# Patient Record
Sex: MALE | Race: WHITE | Employment: OTHER | URBAN - METROPOLITAN AREA
[De-identification: names, ages, dates, MRNs, and addresses within clinical notes are randomized per-mention and may not be internally consistent; named-entity substitution may affect disease eponyms.]

---

## 2018-11-20 ENCOUNTER — HOSPITAL ENCOUNTER (INPATIENT)
Age: 78
LOS: 1 days | Discharge: HOME | DRG: 175 | End: 2018-11-22
Attending: EMERGENCY MEDICINE | Admitting: HOSPITALIST
Payer: MEDICARE

## 2018-11-20 ENCOUNTER — APPOINTMENT (OUTPATIENT)
Dept: CT IMAGING | Age: 78
DRG: 175 | End: 2018-11-20
Payer: MEDICARE

## 2018-11-20 ENCOUNTER — APPOINTMENT (OUTPATIENT)
Dept: GENERAL RADIOLOGY | Age: 78
DRG: 175 | End: 2018-11-20
Payer: MEDICARE

## 2018-11-20 DIAGNOSIS — I26.99 PULMONARY EMBOLUS AND INFARCTION (HCC): ICD-10-CM

## 2018-11-20 DIAGNOSIS — I26.99 OTHER ACUTE PULMONARY EMBOLISM WITHOUT ACUTE COR PULMONALE (HCC): ICD-10-CM

## 2018-11-20 DIAGNOSIS — I26.99 PULMONARY INFARCT (HCC): ICD-10-CM

## 2018-11-20 DIAGNOSIS — R07.9 RIGHT-SIDED CHEST PAIN: Primary | ICD-10-CM

## 2018-11-20 LAB
A/G RATIO: 1.3 (ref 1.1–2.2)
ALBUMIN SERPL-MCNC: 4 G/DL (ref 3.4–5)
ALP BLD-CCNC: 77 U/L (ref 40–129)
ALT SERPL-CCNC: 13 U/L (ref 10–40)
ANION GAP SERPL CALCULATED.3IONS-SCNC: 14 MMOL/L (ref 3–16)
AST SERPL-CCNC: 16 U/L (ref 15–37)
BASOPHILS ABSOLUTE: 0 K/UL (ref 0–0.2)
BASOPHILS RELATIVE PERCENT: 0 %
BILIRUB SERPL-MCNC: 1.5 MG/DL (ref 0–1)
BUN BLDV-MCNC: 30 MG/DL (ref 7–20)
CALCIUM SERPL-MCNC: 9.3 MG/DL (ref 8.3–10.6)
CHLORIDE BLD-SCNC: 99 MMOL/L (ref 99–110)
CO2: 22 MMOL/L (ref 21–32)
CREAT SERPL-MCNC: 1 MG/DL (ref 0.8–1.3)
EOSINOPHILS ABSOLUTE: 0 K/UL (ref 0–0.6)
EOSINOPHILS RELATIVE PERCENT: 0 %
GFR AFRICAN AMERICAN: >60
GFR NON-AFRICAN AMERICAN: >60
GLOBULIN: 3.1 G/DL
GLUCOSE BLD-MCNC: 185 MG/DL (ref 70–99)
HCT VFR BLD CALC: 47.9 % (ref 40.5–52.5)
HEMATOLOGY PATH CONSULT: YES
HEMOGLOBIN: 16.5 G/DL (ref 13.5–17.5)
LACTIC ACID: 1.5 MMOL/L (ref 0.4–2)
LIPASE: 34 U/L (ref 13–60)
LYMPHOCYTES ABSOLUTE: 1.5 K/UL (ref 1–5.1)
LYMPHOCYTES RELATIVE PERCENT: 10 %
MCH RBC QN AUTO: 30.7 PG (ref 26–34)
MCHC RBC AUTO-ENTMCNC: 34.5 G/DL (ref 31–36)
MCV RBC AUTO: 88.9 FL (ref 80–100)
MONOCYTES ABSOLUTE: 1.8 K/UL (ref 0–1.3)
MONOCYTES RELATIVE PERCENT: 12 %
NEUTROPHILS ABSOLUTE: 11.9 K/UL (ref 1.7–7.7)
NEUTROPHILS RELATIVE PERCENT: 78 %
PDW BLD-RTO: 13.4 % (ref 12.4–15.4)
PLATELET # BLD: 164 K/UL (ref 135–450)
PLATELET SLIDE REVIEW: ADEQUATE
PMV BLD AUTO: 9.7 FL (ref 5–10.5)
POTASSIUM SERPL-SCNC: 3.7 MMOL/L (ref 3.5–5.1)
RBC # BLD: 5.39 M/UL (ref 4.2–5.9)
RBC # BLD: NORMAL 10*6/UL
SLIDE REVIEW: ABNORMAL
SODIUM BLD-SCNC: 135 MMOL/L (ref 136–145)
TOTAL PROTEIN: 7.1 G/DL (ref 6.4–8.2)
WBC # BLD: 15.2 K/UL (ref 4–11)

## 2018-11-20 PROCEDURE — 85025 COMPLETE CBC W/AUTO DIFF WBC: CPT

## 2018-11-20 PROCEDURE — 83690 ASSAY OF LIPASE: CPT

## 2018-11-20 PROCEDURE — 83605 ASSAY OF LACTIC ACID: CPT

## 2018-11-20 PROCEDURE — 6370000000 HC RX 637 (ALT 250 FOR IP): Performed by: NURSE PRACTITIONER

## 2018-11-20 PROCEDURE — 99285 EMERGENCY DEPT VISIT HI MDM: CPT

## 2018-11-20 PROCEDURE — 6360000004 HC RX CONTRAST MEDICATION: Performed by: NURSE PRACTITIONER

## 2018-11-20 PROCEDURE — 71046 X-RAY EXAM CHEST 2 VIEWS: CPT

## 2018-11-20 PROCEDURE — 93005 ELECTROCARDIOGRAM TRACING: CPT | Performed by: NURSE PRACTITIONER

## 2018-11-20 PROCEDURE — 71275 CT ANGIOGRAPHY CHEST: CPT

## 2018-11-20 PROCEDURE — 85610 PROTHROMBIN TIME: CPT

## 2018-11-20 PROCEDURE — 80053 COMPREHEN METABOLIC PANEL: CPT

## 2018-11-20 PROCEDURE — 74177 CT ABD & PELVIS W/CONTRAST: CPT

## 2018-11-20 PROCEDURE — 85730 THROMBOPLASTIN TIME PARTIAL: CPT

## 2018-11-20 RX ORDER — HEPARIN SODIUM 1000 [USP'U]/ML
40 INJECTION, SOLUTION INTRAVENOUS; SUBCUTANEOUS PRN
Status: DISCONTINUED | OUTPATIENT
Start: 2018-11-20 | End: 2018-11-20

## 2018-11-20 RX ORDER — HEPARIN SODIUM 10000 [USP'U]/100ML
16.2 INJECTION, SOLUTION INTRAVENOUS CONTINUOUS
Status: DISCONTINUED | OUTPATIENT
Start: 2018-11-21 | End: 2018-11-21

## 2018-11-20 RX ORDER — HEPARIN SODIUM 1000 [USP'U]/ML
80 INJECTION, SOLUTION INTRAVENOUS; SUBCUTANEOUS PRN
Status: DISCONTINUED | OUTPATIENT
Start: 2018-11-20 | End: 2018-11-20

## 2018-11-20 RX ORDER — AMOXICILLIN AND CLAVULANATE POTASSIUM 875; 125 MG/1; MG/1
1 TABLET, FILM COATED ORAL ONCE
Status: COMPLETED | OUTPATIENT
Start: 2018-11-20 | End: 2018-11-20

## 2018-11-20 RX ORDER — HEPARIN SODIUM 1000 [USP'U]/ML
7200 INJECTION, SOLUTION INTRAVENOUS; SUBCUTANEOUS ONCE
Status: DISCONTINUED | OUTPATIENT
Start: 2018-11-21 | End: 2018-11-21

## 2018-11-20 RX ORDER — AZITHROMYCIN 500 MG/1
500 TABLET, FILM COATED ORAL ONCE
Status: COMPLETED | OUTPATIENT
Start: 2018-11-20 | End: 2018-11-20

## 2018-11-20 RX ADMIN — IOPAMIDOL 75 ML: 755 INJECTION, SOLUTION INTRAVENOUS at 22:22

## 2018-11-20 RX ADMIN — AMOXICILLIN AND CLAVULANATE POTASSIUM 1 TABLET: 875; 125 TABLET, FILM COATED ORAL at 22:55

## 2018-11-20 RX ADMIN — AZITHROMYCIN 500 MG: 500 TABLET, FILM COATED ORAL at 22:55

## 2018-11-20 ASSESSMENT — ENCOUNTER SYMPTOMS
ABDOMINAL PAIN: 1
VOMITING: 0
NAUSEA: 0
SHORTNESS OF BREATH: 1

## 2018-11-20 ASSESSMENT — PAIN DESCRIPTION - PAIN TYPE: TYPE: ACUTE PAIN

## 2018-11-20 ASSESSMENT — PAIN SCALES - GENERAL: PAINLEVEL_OUTOF10: 7

## 2018-11-20 ASSESSMENT — PAIN DESCRIPTION - LOCATION: LOCATION: ABDOMEN

## 2018-11-20 ASSESSMENT — PAIN DESCRIPTION - DESCRIPTORS: DESCRIPTORS: CRAMPING

## 2018-11-21 PROBLEM — I26.99 PULMONARY EMBOLUS AND INFARCTION (HCC): Status: ACTIVE | Noted: 2018-11-21

## 2018-11-21 LAB
APTT: 117 SEC (ref 26–36)
APTT: 27.3 SEC (ref 26–36)
APTT: 77.5 SEC (ref 26–36)
APTT: 87.9 SEC (ref 26–36)
EKG ATRIAL RATE: 92 BPM
EKG DIAGNOSIS: NORMAL
EKG P AXIS: 48 DEGREES
EKG P-R INTERVAL: 164 MS
EKG Q-T INTERVAL: 362 MS
EKG QRS DURATION: 94 MS
EKG QTC CALCULATION (BAZETT): 447 MS
EKG R AXIS: -51 DEGREES
EKG T AXIS: 46 DEGREES
EKG VENTRICULAR RATE: 92 BPM
HEMATOLOGY PATH CONSULT: NORMAL
INR BLD: 1.33 (ref 0.86–1.14)
LV EF: 55 %
LVEF MODALITY: NORMAL
PROTHROMBIN TIME: 15.2 SEC (ref 9.8–13)

## 2018-11-21 PROCEDURE — 99223 1ST HOSP IP/OBS HIGH 75: CPT | Performed by: INTERNAL MEDICINE

## 2018-11-21 PROCEDURE — 85730 THROMBOPLASTIN TIME PARTIAL: CPT

## 2018-11-21 PROCEDURE — 2700000000 HC OXYGEN THERAPY PER DAY

## 2018-11-21 PROCEDURE — 93306 TTE W/DOPPLER COMPLETE: CPT

## 2018-11-21 PROCEDURE — 6360000002 HC RX W HCPCS: Performed by: HOSPITALIST

## 2018-11-21 PROCEDURE — 93010 ELECTROCARDIOGRAM REPORT: CPT | Performed by: INTERNAL MEDICINE

## 2018-11-21 PROCEDURE — 2060000000 HC ICU INTERMEDIATE R&B

## 2018-11-21 PROCEDURE — 6370000000 HC RX 637 (ALT 250 FOR IP): Performed by: HOSPITALIST

## 2018-11-21 PROCEDURE — 36415 COLL VENOUS BLD VENIPUNCTURE: CPT

## 2018-11-21 PROCEDURE — 94761 N-INVAS EAR/PLS OXIMETRY MLT: CPT

## 2018-11-21 PROCEDURE — 93970 EXTREMITY STUDY: CPT

## 2018-11-21 RX ORDER — SODIUM CHLORIDE 0.9 % (FLUSH) 0.9 %
10 SYRINGE (ML) INJECTION PRN
Status: DISCONTINUED | OUTPATIENT
Start: 2018-11-21 | End: 2018-11-22 | Stop reason: HOSPADM

## 2018-11-21 RX ORDER — HEPARIN SODIUM 10000 [USP'U]/100ML
12.6 INJECTION, SOLUTION INTRAVENOUS CONTINUOUS
Status: DISCONTINUED | OUTPATIENT
Start: 2018-11-21 | End: 2018-11-22

## 2018-11-21 RX ORDER — ASPIRIN 81 MG/1
81 TABLET, CHEWABLE ORAL DAILY
Status: ON HOLD | COMMUNITY
End: 2018-11-22 | Stop reason: HOSPADM

## 2018-11-21 RX ORDER — OXYCODONE HYDROCHLORIDE AND ACETAMINOPHEN 5; 325 MG/1; MG/1
2 TABLET ORAL EVERY 4 HOURS PRN
Status: DISCONTINUED | OUTPATIENT
Start: 2018-11-21 | End: 2018-11-22 | Stop reason: HOSPADM

## 2018-11-21 RX ORDER — OXYCODONE HYDROCHLORIDE AND ACETAMINOPHEN 5; 325 MG/1; MG/1
1 TABLET ORAL EVERY 4 HOURS PRN
Status: DISCONTINUED | OUTPATIENT
Start: 2018-11-21 | End: 2018-11-22 | Stop reason: HOSPADM

## 2018-11-21 RX ORDER — ACETAMINOPHEN 325 MG/1
650 TABLET ORAL EVERY 4 HOURS PRN
Status: DISCONTINUED | OUTPATIENT
Start: 2018-11-21 | End: 2018-11-22 | Stop reason: HOSPADM

## 2018-11-21 RX ORDER — TRIAMTERENE AND HYDROCHLOROTHIAZIDE 37.5; 25 MG/1; MG/1
1 TABLET ORAL DAILY
Status: ON HOLD | COMMUNITY
End: 2018-11-22 | Stop reason: HOSPADM

## 2018-11-21 RX ORDER — HEPARIN SODIUM 1000 [USP'U]/ML
80 INJECTION, SOLUTION INTRAVENOUS; SUBCUTANEOUS PRN
Status: DISCONTINUED | OUTPATIENT
Start: 2018-11-21 | End: 2018-11-21

## 2018-11-21 RX ORDER — LISINOPRIL AND HYDROCHLOROTHIAZIDE 25; 20 MG/1; MG/1
1 TABLET ORAL DAILY
Status: ON HOLD | COMMUNITY
End: 2018-11-21 | Stop reason: CLARIF

## 2018-11-21 RX ORDER — LISINOPRIL 20 MG/1
20 TABLET ORAL 2 TIMES DAILY
Status: ON HOLD | COMMUNITY
End: 2018-11-22

## 2018-11-21 RX ORDER — VERAPAMIL HYDROCHLORIDE 240 MG/1
240 TABLET, FILM COATED, EXTENDED RELEASE ORAL NIGHTLY
Status: DISCONTINUED | OUTPATIENT
Start: 2018-11-21 | End: 2018-11-22 | Stop reason: HOSPADM

## 2018-11-21 RX ORDER — CHLORAL HYDRATE 500 MG
1000 CAPSULE ORAL DAILY
COMMUNITY

## 2018-11-21 RX ORDER — ATENOLOL 25 MG/1
25 TABLET ORAL 2 TIMES DAILY
Status: DISCONTINUED | OUTPATIENT
Start: 2018-11-21 | End: 2018-11-22

## 2018-11-21 RX ORDER — SODIUM CHLORIDE 0.9 % (FLUSH) 0.9 %
10 SYRINGE (ML) INJECTION EVERY 12 HOURS SCHEDULED
Status: DISCONTINUED | OUTPATIENT
Start: 2018-11-21 | End: 2018-11-22 | Stop reason: HOSPADM

## 2018-11-21 RX ORDER — ATENOLOL 25 MG/1
25 TABLET ORAL 2 TIMES DAILY
Status: ON HOLD | COMMUNITY
End: 2018-11-22

## 2018-11-21 RX ORDER — ONDANSETRON 2 MG/ML
4 INJECTION INTRAMUSCULAR; INTRAVENOUS EVERY 6 HOURS PRN
Status: DISCONTINUED | OUTPATIENT
Start: 2018-11-21 | End: 2018-11-22 | Stop reason: HOSPADM

## 2018-11-21 RX ORDER — HEPARIN SODIUM 1000 [USP'U]/ML
40 INJECTION, SOLUTION INTRAVENOUS; SUBCUTANEOUS PRN
Status: DISCONTINUED | OUTPATIENT
Start: 2018-11-21 | End: 2018-11-21

## 2018-11-21 RX ORDER — ATORVASTATIN CALCIUM 20 MG/1
20 TABLET, FILM COATED ORAL NIGHTLY
Status: DISCONTINUED | OUTPATIENT
Start: 2018-11-21 | End: 2018-11-22 | Stop reason: HOSPADM

## 2018-11-21 RX ORDER — LOVASTATIN 40 MG/1
40 TABLET ORAL 2 TIMES DAILY
COMMUNITY

## 2018-11-21 RX ORDER — VERAPAMIL HYDROCHLORIDE 240 MG/1
240 CAPSULE, EXTENDED RELEASE ORAL NIGHTLY
COMMUNITY

## 2018-11-21 RX ADMIN — ATENOLOL 25 MG: 25 TABLET ORAL at 15:17

## 2018-11-21 RX ADMIN — HEPARIN SODIUM 16.2 ML/HR: 10000 INJECTION, SOLUTION INTRAVENOUS at 01:12

## 2018-11-21 RX ADMIN — ATORVASTATIN CALCIUM 20 MG: 20 TABLET, FILM COATED ORAL at 21:37

## 2018-11-21 RX ADMIN — ATENOLOL 25 MG: 25 TABLET ORAL at 21:36

## 2018-11-21 RX ADMIN — VERAPAMIL HYDROCHLORIDE 240 MG: 240 TABLET, FILM COATED, EXTENDED RELEASE ORAL at 21:37

## 2018-11-21 RX ADMIN — HEPARIN SODIUM 16.2 ML/HR: 10000 INJECTION, SOLUTION INTRAVENOUS at 17:48

## 2018-11-21 ASSESSMENT — PAIN SCALES - GENERAL
PAINLEVEL_OUTOF10: 0
PAINLEVEL_OUTOF10: 0
PAINLEVEL_OUTOF10: 7
PAINLEVEL_OUTOF10: 0
PAINLEVEL_OUTOF10: 0
PAINLEVEL_OUTOF10: 2

## 2018-11-21 ASSESSMENT — PAIN DESCRIPTION - PAIN TYPE: TYPE: ACUTE PAIN

## 2018-11-21 ASSESSMENT — PAIN DESCRIPTION - LOCATION: LOCATION: ABDOMEN

## 2018-11-21 NOTE — CONSULTS
pm    COMPARISON:  None. HISTORY:  ORDERING SYSTEM PROVIDED HISTORY: sob  TECHNOLOGIST PROVIDED HISTORY:  Reason for exam:->sob  Ordering Physician Provided Reason for Exam: SOB  Acuity: Acute  Type of Exam: Initial    FINDINGS:  Heart size is normal.  Mediastinal contours are normal.  Pulmonary  vascularity is normal.  Atherosclerotic change is seen in the aorta. Hazy  opacity is seen at the lung bases bilaterally, right greater than left. Superimposed pleural fluid is likely present bilaterally, right greater than  left      Impression Hazy opacity at the lung bases, either atelectasis or pneumonia         CXR portable: No results found for this or any previous visit. Assessment:     Bilateral Pulmonary Emboli    Plan:      -Unclear precipitating event. No known malignancy, no recent travel or trauma or immobilization. -will likely need hypercoagulable work up follow anticoagulation, I suppose some genetic mutations could be tested at this time. Will also need age appropriate cancer screening.  -evidence of possible RLL infarction on CT scan, will monitor. Likely explains RUQ pain via diaphragmatic irritation.  -agree with heparin gtt for now, no signs of bleeding or hemodynamic compromise to necessitate change in therapy. -LE dopplers and echo pending. We will follow along.     Thank you for the consult    [address]  Pulmonary, Sleep and Critical Care Medicine

## 2018-11-21 NOTE — ED PROVIDER NOTES
Attending Supervisory Note/Shared Visit   I have personally performed a face to face diagnostic evaluation on this patient. I have reviewed the mid-levels findings and agree. History is remarkable for RUQ abd pain. Pt has been feeling sore in RUQ and R lower chest and had flue shot yesterday which exacerbated sx. Exam is remarkable for no surgical RUQ findings. However, pt noted to be tachypneic on arrival and had some crackles in bases. Elevated WBC and concern for PNA discussed with ALAN. EKG (interpreted by me)  Rhythm: normal sinus at 92 bpm  Axis: normal  Conduction: LAFB  ST/T Segments: Normal  Clinical Impression: No acute findings. No prior EKG. Labs Reviewed   CBC WITH AUTO DIFFERENTIAL - Abnormal; Notable for the following:        Result Value    WBC 15.2 (*)     Neutrophils # 11.9 (*)     Monocytes # 1.8 (*)     All other components within normal limits    Narrative:     Performed at:  [facility]  [address] [place]   Phone [phone]   COMPREHENSIVE METABOLIC PANEL - Abnormal; Notable for the following:     Sodium 135 (*)     Glucose 185 (*)     BUN 30 (*)     Total Bilirubin 1.5 (*)     All other components within normal limits    Narrative:     Performed at:  [facility]  [address] [facility] [id number]   Phone [phone]   LIPASE    Narrative:     Performed at:  [facility]  [address] [facility] [address] [address]   Phone [phone]   LACTIC ACID, PLASMA    Narrative:     Performed at:  [facility]  [address] [facility] [address] [address]   Phone [phone]   URINE RT REFLEX TO CULTURE   PROTIME-INR   APTT   APTT     CTA PULMONARY W CONTRAST   Preliminary Result   Acute segmental and subsegmental pulmonary emboli involving all 5 lobes.       No evidence of right heart strain, however, there are

## 2018-11-21 NOTE — ED PROVIDER NOTES
[address]  [address] [facility] [address]  Dept: [phone]  Loc: [phone]  eMERGENCYdEPARTMENT eNCOUnter      Pt Name: Funmilayo Robledo  MRN: [id number]  [place] 1940  Date of evaluation: 11/20/2018  Provider:Caroline Centeno, [address] [facility]       Chief Complaint   Patient presents with    Abdominal Pain     abdominal pain since 11/19/2018 in pm       CRITICAL CARE TIME       HISTORY OF PRESENT ILLNESS  (Location/Symptom, Timing/Onset, Context/Setting, Quality, Duration,Modifying Factors, Severity.)   Funmilayo Robledo is a 66 y.o. male who presents to the emergency department Complaining of shortness of breath, shakiness, right upper quadrant abdominal pain, right lower chest pain for several days. Much worse the last 24 hours. Prior to the onset of pain was having cough and shortness of breath. Unsure fever. Negative nausea. Pain is constant. Went to urgent care prior to the ED was sent to the ED from urgent care. Negative for midsternal chest pain or left-sided chest pain. Negative for back pain. Negative for hemoptysis. Went to [email or web address] prior to coming to the emergency department or going to urgent care and had a couple soup and half of a group GC and much. The pain did not worsen. Patient is accompanied by his daughter. Patient reports limited health history. No history of atrial fibrillation. No history of Pain tenderness or swelling. Does report received a flu vaccination today. Nursing Notes were reviewedand agreed with or any disagreements were addressed in the HPI. REVIEW OF SYSTEMS    (2-9 systems for level 4, 10 or more for level 5)     Review of Systems   Constitutional: Positive for activity change and appetite change. HENT: Negative. Respiratory: Positive for shortness of breath. Cardiovascular: Positive for chest pain.    Gastrointestinal: Positive for abdominal is tenderness in the right upper quadrant. There is no rigidity, no rebound, no guarding, no CVA tenderness, no tenderness at McBurney's point and negative Jean Baptiste's sign. Neurological: He is alert and oriented to person, place, and time. Skin: Skin is warm and dry. Capillary refill takes less than 2 seconds. He is not diaphoretic. No pallor. Nursing note and vitals reviewed. DIAGNOSTIC RESULTS     EKG: All EKG's are interpreted by the Emergency Department Physician who either signs or Co-signs this chart in the absence of a cardiologist.    RADIOLOGY:   Non-plain film images such as CT, Ultrasound and MRI are read by the radiologist. Plain radiographic images are visualized and preliminarilyinterpreted by the emergency physician with the below findings:    Interpretation per the Radiologist below,if available at the time of this note:    CTA PULMONARY W CONTRAST   Preliminary Result   Acute segmental and subsegmental pulmonary emboli involving all 5 lobes. No evidence of right heart strain, however, there are developing pulmonary   infarcts at the right lung base, probably the left lung base as well. Small right pleural effusion is presumably reactive. CT ABDOMEN PELVIS W IV CONTRAST Additional Contrast? None   Final Result   1. No acute findings identified in the abdomen and pelvis.          XR CHEST STANDARD (2 VW)   Final Result   Hazy opacity at the lung bases, either atelectasis or pneumonia               LABS:  Labs Reviewed   CBC WITH AUTO DIFFERENTIAL - Abnormal; Notable for the following:        Result Value    WBC 15.2 (*)     Neutrophils # 11.9 (*)     Monocytes # 1.8 (*)     All other components within normal limits    Narrative:     Performed at:  [address] [address]   Phone [phone]   COMPREHENSIVE METABOLIC PANEL - Abnormal; Notable for the following:     Sodium 135 (*)     Glucose 185 (*)     BUN

## 2018-11-21 NOTE — CONSULTS
25,000 units in dextrose 5% 250 mL infusion  16.2 mL/hr Intravenous Continuous Tom Patterson MD 16.2 mL/hr at 11/21/18 1748 16.2 mL/hr at 11/21/18 1748    acetaminophen (TYLENOL) tablet 650 mg  650 mg Oral Q4H PRN Tom Patterson MD        oxyCODONE-acetaminophen (PERCOCET) 5-325 MG per tablet 1 tablet  1 tablet Oral Q4H PRN Tom Patterson MD        oxyCODONE-acetaminophen (PERCOCET) 5-325 MG per tablet 2 tablet  2 tablet Oral Q4H PRN Tom Patterson MD        atenolol (TENORMIN) tablet 25 mg  25 mg Oral BID Rosie Amador MD   25 mg at 11/21/18 1517    atorvastatin (LIPITOR) tablet 20 mg  20 mg Oral Nightly Rosie Amador MD        verapamil (CALAN SR) extended release tablet 240 mg  240 mg Oral Nightly Rosie Amador MD           ALLERGIES:    No Known Allergies    SOCIAL HISTORY:    Social History     Social History    Marital status: Single     Spouse name: N/A    Number of children: N/A    Years of education: N/A     Social History Main Topics    Smoking status: Never Smoker    Smokeless tobacco: Never Used    Alcohol use None    Drug use: Unknown    Sexual activity: Not Asked     Other Topics Concern    None     Social History Narrative    None   :      FAMILY HISTORY:    No family history on file. REVIEW OF SYSTEMS:      · Constitutional: Denies fever, sweats, weight loss. · Eyes: No visual changes or diplopia. No scleral icterus. · ENT: No Headaches, hearing loss or vertigo. No mouth sores or sore throat. · Cardiovascular: Pos chest pain, Pos dyspnea on exertion, palpitations or loss of consciousness. · Respiratory: No cough or wheezing, no sputum production. No hemoptysis. .    · Gastrointestinal: No abdominal pain, appetite loss, blood in stools. No change in bowel habits. · Genitourinary: No dysuria, trouble voiding, or hematuria. · Musculoskeletal:  No generalized weakness. No joint complaints. · Integumentary: No rash or pruritis.   · Neurological: No headache, diplopia. No change in gait, balance, or coordination. No paresthesias. · Endocrine: No temperature intolerance. No excessive thirst, fluid intake, or urination. · Hematologic/Lymphatic: No abnormal bruising or ecchymoses, blood clots or swollen lymph nodes. · Allergic/Immunologic: No nasal congestion or hives. PHYSICAL EXAM:      Vitals:  BP (!) 139/95   Pulse 80   Temp 98.1 °F (36.7 °C) (Oral)   Resp 18   Ht 6' 1\" (1.854 m)   Wt 194 lb 10.7 oz (88.3 kg)   SpO2 96%   BMI 25.68 kg/m²     CONSTITUTIONAL: Well-appearing, no acute distress. Wearing oxygen by nasal cannula  EYES: Sclera clear, conjunctiva normal  ENT: Oral pharynx unremarkable with moist mucus membranes  LUNGS: Clear to auscultation. No increased labor with breathing. CARDIOVASCULAR: Regular rate and rhythm, normal S1 and S2, no S3 or S4, and no murmur noted. ABDOMEN: Soft, non-distended, non-tender  MUSCULOSKELETAL: There is no redness, warmth, or swelling of the joints. NEUROLOGIC: Awake, alert. Grossly non-focal.  SKIN: No bruising or bleeding. PSYCH: Normal affect.       LAB DATA:    Labs:  General Labs:  CBC with Differential:    Lab Results   Component Value Date    WBC 15.2 11/20/2018    RBC 5.39 11/20/2018    HGB 16.5 11/20/2018    HCT 47.9 11/20/2018     11/20/2018    MCV 88.9 11/20/2018    MCH 30.7 11/20/2018    MCHC 34.5 11/20/2018    RDW 13.4 11/20/2018    LYMPHOPCT 10.0 11/20/2018    MONOPCT 12.0 11/20/2018    BASOPCT 0.0 11/20/2018    MONOSABS 1.8 11/20/2018    LYMPHSABS 1.5 11/20/2018    EOSABS 0.0 11/20/2018    BASOSABS 0.0 11/20/2018     CMP:    Lab Results   Component Value Date     11/20/2018    K 3.7 11/20/2018    CL 99 11/20/2018    CO2 22 11/20/2018    BUN 30 11/20/2018    CREATININE 1.0 11/20/2018    GFRAA >60 11/20/2018    AGRATIO 1.3 11/20/2018    LABGLOM >60 11/20/2018    GLUCOSE 185 11/20/2018    PROT 7.1 11/20/2018    CALCIUM 9.3 11/20/2018    BILITOT 1.5 11/20/2018    ALKPHOS 77 11/20/2018    AST 16 11/20/2018    ALT 13 11/20/2018     Magnesium:  No results found for: MG  PT/INR:  No results found for: PTINR  INR:   Lab Results   Component Value Date    INR 1.33 11/20/2018     PTT:    Lab Results   Component Value Date    APTT 87.9 11/21/2018   [APTT  U/A:  No results found for: NITRITE, COLORU, PHUR, LABCAST, WBCUA, RBCUA, MUCUS, TRICHOMONAS, YEAST, BACTERIA, CLARITYU, SPECGRAV, LEUKOCYTESUR, UROBILINOGEN, BILIRUBINUR, BLOODU, GLUCOSEU, AMORPHOUS    CTA OF THE CHEST, 11/20/2018 10:27 pm       TECHNIQUE:   CTA of the chest was performed after the administration of intravenous   contrast.  Multiplanar reformatted images are provided for review.  MIP   images are provided for review.  Dose modulation, iterative reconstruction,   and/or weight based adjustment of the mA/kV was utilized to reduce the   radiation dose to as low as reasonably achievable.       COMPARISON:   None       HISTORY:   ORDERING SYSTEM PROVIDED HISTORY: Pulm embo, SO right lower chest pain   TECHNOLOGIST PROVIDED HISTORY:   Ordering Physician Provided Reason for Exam: Pulm embo, SOB,  right lower   chest pain   Acuity: Acute   Type of Exam: Initial       FINDINGS:   Pulmonary Arteries: Pulmonary arteries are adequately opacified for   evaluation.  Multifocal segmental and subsegmental branch filling defects in   the right upper, middle and lower lobes as well as the left upper and lower   lobes.  No saddle pulmonary embolism.  Main pulmonary artery is normal in   caliber.       Mediastinum: No evidence of mediastinal lymphadenopathy.  The heart and   pericardium demonstrate no acute abnormality.  Specifically, no evidence of   right heart strain.  Mild multivessel coronary artery calcifications. Indira Munda   is no acute abnormality of the thoracic aorta.       Lungs/pleura: Small right pleural effusion.  Multifocal subpleural   ground-glass and consolidative opacities in the right middle and lower lobes   are within

## 2018-11-21 NOTE — PROGRESS NOTES
Clinical Pharmacy Note  Heparin Dosing       Lab Results   Component Value Date    APTT 77.5 11/21/2018     Lab Results   Component Value Date    HGB 16.5 11/20/2018    HCT 47.9 11/20/2018     11/20/2018    INR 1.33 11/20/2018       Current Infusion Rate: 16.2 mL/hr    Plan:  No change in current rate  Rate: 16.2 mL/hr  Next aPTT: 1300 11/21/18    Pharmacy will continue to monitor and adjust based on aPTT results.

## 2018-11-21 NOTE — H&P
Hospital Medicine History & Physical      PCP: Mac Main MD    Date of Admission: 11/20/2018    Date of Service: Pt seen/examined on 11/21/18 and Admitted to Inpatient with expected LOS greater than two midnights due to medical therapy. Chief Complaint:  Sob, RUQ abdominal pain      History Of Present Illness:      66 y.o. male with PMHx essential HTN,HLD presented to the ED with hx sob,RUQ/Right lower chest pain for the past 2 days. Pain was severe over last 24 hrs,non radiating with no aggravating or relieving factors,associated with non productive cough but no hemoptysis. No fevers,chills,rigors. No nausea or vomiting. No orthopnea,PND,leg swelling or pain. No prior hx DVT/PE,. No recent   Prolonged immobilization including surgery or long distance travel. No hx malignancy,follow regularly with [facility]CP[facility] He has been on good health and walks everyday. In the ED,vitals were stable, CTA chest showed   Acute segmental and subsegmental pulmonary emboli involving all 5 lobes. No evidence of right heart strain, however, there are developing pulmonary  infarcts at the right lung base, probably the left lung base as well. Small right pleural effusion is presumably reactive    Past Medical History:        Essential HTN  hyperlipidemia    Past Surgical History:      No past surgical history on file. Medications Prior to Admission:        verapamil  Lovastatin  lisinopril/HCTZ  atenolol      Allergies:  Patient has no known allergies. Social History:      The patient currently lives     TOBACCO:   has no tobacco history on file. ETOH:   has no alcohol history on file. Family History:      Reviewed in detail and negative for DM, CAD, Cancer, CVA. Positive as follows:    No family history on file. REVIEW OF SYSTEMS:   Pertinent positives as noted in the HPI. All other systems reviewed and negative.     PHYSICAL EXAM:    BP (!) 145/86   Pulse 94   Temp 97 °F (36.1 °C) (Oral)

## 2018-11-22 VITALS
SYSTOLIC BLOOD PRESSURE: 127 MMHG | TEMPERATURE: 97.3 F | HEIGHT: 73 IN | HEART RATE: 69 BPM | DIASTOLIC BLOOD PRESSURE: 74 MMHG | OXYGEN SATURATION: 95 % | BODY MASS INDEX: 25.8 KG/M2 | WEIGHT: 194.67 LBS | RESPIRATION RATE: 22 BRPM

## 2018-11-22 LAB
ANION GAP SERPL CALCULATED.3IONS-SCNC: 11 MMOL/L (ref 3–16)
APTT: 93.2 SEC (ref 26–36)
APTT: 96.7 SEC (ref 26–36)
BASOPHILS ABSOLUTE: 0 K/UL (ref 0–0.2)
BASOPHILS RELATIVE PERCENT: 0.4 %
BUN BLDV-MCNC: 27 MG/DL (ref 7–20)
CALCIUM SERPL-MCNC: 8.9 MG/DL (ref 8.3–10.6)
CHLORIDE BLD-SCNC: 101 MMOL/L (ref 99–110)
CO2: 25 MMOL/L (ref 21–32)
CREAT SERPL-MCNC: 1.1 MG/DL (ref 0.8–1.3)
EOSINOPHILS ABSOLUTE: 0.1 K/UL (ref 0–0.6)
EOSINOPHILS RELATIVE PERCENT: 1.1 %
GFR AFRICAN AMERICAN: >60
GFR NON-AFRICAN AMERICAN: >60
GLUCOSE BLD-MCNC: 124 MG/DL (ref 70–99)
HCT VFR BLD CALC: 42.9 % (ref 40.5–52.5)
HEMOGLOBIN: 14.6 G/DL (ref 13.5–17.5)
LYMPHOCYTES ABSOLUTE: 1.6 K/UL (ref 1–5.1)
LYMPHOCYTES RELATIVE PERCENT: 16.5 %
MCH RBC QN AUTO: 30.4 PG (ref 26–34)
MCHC RBC AUTO-ENTMCNC: 34.1 G/DL (ref 31–36)
MCV RBC AUTO: 89.1 FL (ref 80–100)
MONOCYTES ABSOLUTE: 1.3 K/UL (ref 0–1.3)
MONOCYTES RELATIVE PERCENT: 13.7 %
NEUTROPHILS ABSOLUTE: 6.6 K/UL (ref 1.7–7.7)
NEUTROPHILS RELATIVE PERCENT: 68.3 %
PDW BLD-RTO: 13.6 % (ref 12.4–15.4)
PLATELET # BLD: 153 K/UL (ref 135–450)
PMV BLD AUTO: 10.2 FL (ref 5–10.5)
POTASSIUM REFLEX MAGNESIUM: 3.7 MMOL/L (ref 3.5–5.1)
RBC # BLD: 4.81 M/UL (ref 4.2–5.9)
SODIUM BLD-SCNC: 137 MMOL/L (ref 136–145)
WBC # BLD: 9.7 K/UL (ref 4–11)

## 2018-11-22 PROCEDURE — 81241 F5 GENE: CPT

## 2018-11-22 PROCEDURE — 99232 SBSQ HOSP IP/OBS MODERATE 35: CPT | Performed by: INTERNAL MEDICINE

## 2018-11-22 PROCEDURE — 85730 THROMBOPLASTIN TIME PARTIAL: CPT

## 2018-11-22 PROCEDURE — 86147 CARDIOLIPIN ANTIBODY EA IG: CPT

## 2018-11-22 PROCEDURE — 2580000003 HC RX 258: Performed by: HOSPITALIST

## 2018-11-22 PROCEDURE — 85025 COMPLETE CBC W/AUTO DIFF WBC: CPT

## 2018-11-22 PROCEDURE — 36415 COLL VENOUS BLD VENIPUNCTURE: CPT

## 2018-11-22 PROCEDURE — 80048 BASIC METABOLIC PNL TOTAL CA: CPT

## 2018-11-22 PROCEDURE — 6370000000 HC RX 637 (ALT 250 FOR IP): Performed by: HOSPITALIST

## 2018-11-22 PROCEDURE — 81240 F2 GENE: CPT

## 2018-11-22 RX ORDER — LISINOPRIL 20 MG/1
20 TABLET ORAL 2 TIMES DAILY
Qty: 30 TABLET | Refills: 3
Start: 2018-11-26

## 2018-11-22 RX ORDER — ATENOLOL 25 MG/1
25 TABLET ORAL 2 TIMES DAILY
Qty: 30 TABLET | Refills: 3
Start: 2018-11-26

## 2018-11-22 RX ADMIN — Medication 10 ML: at 08:37

## 2018-11-22 RX ADMIN — APIXABAN 10 MG: 5 TABLET, FILM COATED ORAL at 08:34

## 2018-11-22 ASSESSMENT — PAIN SCALES - GENERAL: PAINLEVEL_OUTOF10: 0

## 2018-11-22 NOTE — PROGRESS NOTES
Clinical Pharmacy Note  Heparin Dosing       Lab Results   Component Value Date    APTT 96.7 11/21/2018     Lab Results   Component Value Date    HGB 16.5 11/20/2018    HCT 47.9 11/20/2018     11/20/2018    INR 1.33 11/20/2018       Current Infusion Rate: 16.2 mL/hr    Plan:  Decrease rate: 14.4 mL/hr  Next aPTT: 0700 11/22/18    Pharmacy will continue to monitor and adjust based on aPTT results.

## 2018-11-22 NOTE — DISCHARGE SUMMARY
Hospitalist Discharge Summary    Patient ID:  Ángela Donohue  [id number]  49 y.o.  1940    Admit date: 11/20/2018    Discharge date: 11/22/2018    Disposition: home    Admission Diagnoses:   Patient Active Problem List   Diagnosis    Pulmonary embolus and infarction [facility])    Right-sided chest pain    Acute respiratory failure with hypoxia ([address]    Bilateral pulmonary embolism [address] [address]       Discharge Diagnoses: Active Problems:    Pulmonary embolus and infarction [facility])    Right-sided chest pain    Acute respiratory failure with hypoxia ([facility])    Bilateral pulmonary embolism (H[facility])  Resolved Problems:    * No resolved hospital problems. *      Code Status:  Limited    Condition:  Stable    Discharge Diet: Diet:  DIET CARDIAC; Hospital Course:      Patient was admitted to our hospital with bilateral PE, and lung infarction. He was seen by hematology. He is being discharge on Eliquis to follow-up with his primary care and hematology as an outpatient. Acute hypoxic respiratory failure and admission  Resolved  Due to PE    Bilateral PEs with lung infarction:  Patient was on heparin for more than one day. We'll switch and liquids. Echocardiogram without right ventricular straining  Oral anticoagulation risks and benefits were discussed with the patient in details. Including the different between Coumadin and Eliquis. Patient understands the risk of bleeding with oral anticoagulation. He agreed on starting Eliquis for PE treatment. Patient will follow with hematology as an outpatient for hypercoagulable workup. Patient is up-to-date in on his cancer screening    Hypertension  We'll hold his home medication for the next couple days as his blood pressure has been running low.        Discharge Medications:   Current Discharge Medication List      START taking these medications    Details   apixaban (ELIQUIS STARTER PACK) 5 MG TABS tablet Take 10 mg (2 tablets) orally twice daily PM   Result Value Ref Range    aPTT 117.0 (HH) 26.0 - 36.0 sec   APTT    Collection Time: 11/21/18 11:47 PM   Result Value Ref Range    aPTT 96.7 (H) 26.0 - 36.0 sec   Basic Metabolic Panel w/ Reflex to MG    Collection Time: 11/22/18  4:51 AM   Result Value Ref Range    Sodium 137 136 - 145 mmol/L    Potassium reflex Magnesium 3.7 3.5 - 5.1 mmol/L    Chloride 101 99 - 110 mmol/L    CO2 25 21 - 32 mmol/L    Anion Gap 11 3 - 16    Glucose 124 (H) 70 - 99 mg/dL    BUN 27 (H) 7 - 20 mg/dL    CREATININE 1.1 0.8 - 1.3 mg/dL    GFR Non-African American >60 >60    GFR African American >60 >60    Calcium 8.9 8.3 - 10.6 mg/dL   CBC Auto Differential    Collection Time: 11/22/18  4:51 AM   Result Value Ref Range    WBC 9.7 4.0 - 11.0 K/uL    RBC 4.81 4.20 - 5.90 M/uL    Hemoglobin 14.6 13.5 - 17.5 g/dL    Hematocrit 42.9 40.5 - 52.5 %    MCV 89.1 80.0 - 100.0 fL    MCH 30.4 26.0 - 34.0 pg    MCHC 34.1 31.0 - 36.0 g/dL    RDW 13.6 12.4 - 15.4 %    Platelets 088 575 - 619 K/uL    MPV 10.2 5.0 - 10.5 fL    Neutrophils % 68.3 %    Lymphocytes % 16.5 %    Monocytes % 13.7 %    Eosinophils % 1.1 %    Basophils % 0.4 %    Neutrophils # 6.6 1.7 - 7.7 K/uL    Lymphocytes # 1.6 1.0 - 5.1 K/uL    Monocytes # 1.3 0.0 - 1.3 K/uL    Eosinophils # 0.1 0.0 - 0.6 K/uL    Basophils # 0.0 0.0 - 0.2 K/uL   APTT    Collection Time: 11/22/18  4:51 AM   Result Value Ref Range    aPTT 93.2 (H) 26.0 - 36.0 sec         Follow up: with Tiburcio Dockery MD    Note that over 30 minutes was spent in preparing discharge papers, discussing discharge with patient, medication review, etc.    Thank you Tiburcio Dockery MD for the opportunity to be involved in this patient's care. If you have any questions or concerns please feel free to contact me at [phone].     Electronically signed by Patricia Emmanuel MD on 11/22/2018 at 11:59 AM

## 2018-11-22 NOTE — PROGRESS NOTES
Pulmonary Progress Note    CC:  Follow up PE    Subjective: On heparin gtt  Oxygen down to 1 liter  Blood pressure low today  ECHO with no RV strain   Says he is less SOB  Family present       Intake/Output Summary (Last 24 hours) at 11/22/18 0817  Last data filed at 11/21/18 2300   Gross per 24 hour   Intake              360 ml   Output                0 ml   Net              360 ml         PHYSICAL EXAM:  Blood pressure (!) 96/55, pulse 53, temperature 98.2 °F (36.8 °C), temperature source Oral, resp. rate 16, height 6' 1\" (1.854 m), weight 194 lb 10.7 oz (88.3 kg), SpO2 96 %.'  Gen: No distress. Eyes: PERRL. No sclera icterus. No conjunctival injection. ENT: No discharge. Pharynx clear. External appearance of ears and nose normal.  Neck: Trachea midline. No obvious mass. Resp: No crackles. No wheezes. No rhonchi. No dullness on percussion. CV: Regular rate. Regular rhythm. No murmur or rub. GI: Non-tender. Non-distended. No hernia. Skin: Warm, dry, normal texture and turgor. No nodule on exposed extremities. Lymph: No cervical LAD. No supraclavicular LAD. M/S: No cyanosis. No clubbing. No joint deformity. Neuro: Moves all four extremities. CN 2-12 tested, no defect noted.   Ext:   no edema    Medications:    Scheduled Meds:   apixaban  10 mg Oral BID    sodium chloride flush  10 mL Intravenous 2 times per day    atorvastatin  20 mg Oral Nightly    verapamil  240 mg Oral Nightly       Continuous Infusions:      PRN Meds:  sodium chloride flush, magnesium hydroxide, ondansetron, acetaminophen, oxyCODONE-acetaminophen, oxyCODONE-acetaminophen    Labs:  CBC: Recent Labs      11/20/18 2113 11/22/18   0451   WBC  15.2*  9.7   HGB  16.5  14.6   HCT  47.9  42.9   MCV  88.9  89.1   PLT  164  153     BMP: Recent Labs      11/20/18 2113 11/22/18   0451   NA  135*  137   K  3.7  3.7   CL  99  101   CO2  22  25   BUN  30*  27*   CREATININE  1.0  1.1     LIVER PROFILE:   Recent Labs      11/20/18

## 2018-11-24 LAB
ANTICARDIOLIPIN IGG ANTIBODY: 0 GPL (ref 0–14)
CARDIOLIPIN AB IGM: 0 MPL (ref 0–12)

## 2018-11-30 LAB
FACTOR V LEIDEN: NEGATIVE
PROTHROMBIN G20210A MUTATION: NEGATIVE
PT PCR SPECIMEN: NORMAL
SPECIMEN: NORMAL

## 2020-01-18 ENCOUNTER — HOSPITAL ENCOUNTER (EMERGENCY)
Age: 80
Discharge: HOME | End: 2020-01-18
Attending: EMERGENCY MEDICINE
Payer: MEDICARE

## 2020-01-18 ENCOUNTER — APPOINTMENT (OUTPATIENT)
Dept: GENERAL RADIOLOGY | Age: 80
End: 2020-01-18
Payer: MEDICARE

## 2020-01-18 ENCOUNTER — APPOINTMENT (OUTPATIENT)
Dept: CT IMAGING | Age: 80
End: 2020-01-18
Payer: MEDICARE

## 2020-01-18 VITALS
SYSTOLIC BLOOD PRESSURE: 140 MMHG | WEIGHT: 197.31 LBS | BODY MASS INDEX: 26.15 KG/M2 | HEART RATE: 66 BPM | OXYGEN SATURATION: 100 % | DIASTOLIC BLOOD PRESSURE: 67 MMHG | HEIGHT: 73 IN | RESPIRATION RATE: 16 BRPM | TEMPERATURE: 96.8 F

## 2020-01-18 LAB
ANION GAP SERPL CALCULATED.3IONS-SCNC: 17 MMOL/L (ref 3–16)
BASOPHILS ABSOLUTE: 0 K/UL (ref 0–0.2)
BASOPHILS RELATIVE PERCENT: 0.5 %
BILIRUBIN URINE: NEGATIVE
BLOOD, URINE: NEGATIVE
BUN BLDV-MCNC: 21 MG/DL (ref 7–20)
CALCIUM SERPL-MCNC: 9.4 MG/DL (ref 8.3–10.6)
CHLORIDE BLD-SCNC: 98 MMOL/L (ref 99–110)
CLARITY: CLEAR
CO2: 21 MMOL/L (ref 21–32)
COLOR: YELLOW
CREAT SERPL-MCNC: 1 MG/DL (ref 0.8–1.3)
EOSINOPHILS ABSOLUTE: 0.2 K/UL (ref 0–0.6)
EOSINOPHILS RELATIVE PERCENT: 2 %
GFR AFRICAN AMERICAN: >60
GFR NON-AFRICAN AMERICAN: >60
GLUCOSE BLD-MCNC: 118 MG/DL (ref 70–99)
GLUCOSE URINE: NEGATIVE MG/DL
HCT VFR BLD CALC: 49.2 % (ref 40.5–52.5)
HEMOGLOBIN: 16.9 G/DL (ref 13.5–17.5)
KETONES, URINE: NEGATIVE MG/DL
LEUKOCYTE ESTERASE, URINE: NEGATIVE
LYMPHOCYTES ABSOLUTE: 1.8 K/UL (ref 1–5.1)
LYMPHOCYTES RELATIVE PERCENT: 20.7 %
MCH RBC QN AUTO: 30.9 PG (ref 26–34)
MCHC RBC AUTO-ENTMCNC: 34.5 G/DL (ref 31–36)
MCV RBC AUTO: 89.7 FL (ref 80–100)
MICROSCOPIC EXAMINATION: NORMAL
MONOCYTES ABSOLUTE: 0.7 K/UL (ref 0–1.3)
MONOCYTES RELATIVE PERCENT: 8.6 %
NEUTROPHILS ABSOLUTE: 5.8 K/UL (ref 1.7–7.7)
NEUTROPHILS RELATIVE PERCENT: 68.2 %
NITRITE, URINE: NEGATIVE
PDW BLD-RTO: 13.9 % (ref 12.4–15.4)
PH UA: 6.5 (ref 5–8)
PLATELET # BLD: 186 K/UL (ref 135–450)
PMV BLD AUTO: 9.6 FL (ref 5–10.5)
POTASSIUM REFLEX MAGNESIUM: 4 MMOL/L (ref 3.5–5.1)
PROTEIN UA: NEGATIVE MG/DL
RBC # BLD: 5.49 M/UL (ref 4.2–5.9)
SODIUM BLD-SCNC: 136 MMOL/L (ref 136–145)
SPECIFIC GRAVITY UA: 1.02 (ref 1–1.03)
URINE REFLEX TO CULTURE: NORMAL
URINE TYPE: NORMAL
UROBILINOGEN, URINE: 1 E.U./DL
WBC # BLD: 8.5 K/UL (ref 4–11)

## 2020-01-18 PROCEDURE — 71046 X-RAY EXAM CHEST 2 VIEWS: CPT

## 2020-01-18 PROCEDURE — 80048 BASIC METABOLIC PNL TOTAL CA: CPT

## 2020-01-18 PROCEDURE — 71260 CT THORAX DX C+: CPT

## 2020-01-18 PROCEDURE — 6360000004 HC RX CONTRAST MEDICATION: Performed by: EMERGENCY MEDICINE

## 2020-01-18 PROCEDURE — 81003 URINALYSIS AUTO W/O SCOPE: CPT

## 2020-01-18 PROCEDURE — 85025 COMPLETE CBC W/AUTO DIFF WBC: CPT

## 2020-01-18 PROCEDURE — 99284 EMERGENCY DEPT VISIT MOD MDM: CPT

## 2020-01-18 PROCEDURE — 2580000003 HC RX 258: Performed by: NURSE PRACTITIONER

## 2020-01-18 RX ORDER — 0.9 % SODIUM CHLORIDE 0.9 %
500 INTRAVENOUS SOLUTION INTRAVENOUS ONCE
Status: COMPLETED | OUTPATIENT
Start: 2020-01-18 | End: 2020-01-18

## 2020-01-18 RX ADMIN — IOPAMIDOL 75 ML: 755 INJECTION, SOLUTION INTRAVENOUS at 15:08

## 2020-01-18 RX ADMIN — SODIUM CHLORIDE 500 ML: 9 INJECTION, SOLUTION INTRAVENOUS at 15:04

## 2020-01-18 NOTE — ED PROVIDER NOTES
Attending Supervisory Note/Shared Visit   I have personally performed a face to face diagnostic evaluation on this patient. I have reviewed the mid-levels findings and agree. History and Exam by me shows well-appearing 29-year-old male with history of PE, no longer anticoagulated for the last several months, presenting with symptoms of upper respiratory infection over the last several weeks. Family concern for recurrent PE. Unclear etiology for his PE in the past.  Unclear if it was provoked. He denies any chest pain or shortness of breath. He has been more fatigued than usual lately. Lungs are clear to auscultation. No respiratory distress. Not hypoxic. Vital signs within normal limits other than some hypertension which resolved during his ED stay. No lower extremity swelling, no calf tenderness or palpable cord. CT angiogram chest performed, by radiology interpretation shows no evidence of PE or pneumonia. We will treat as bronchitis with symptomatic management, give return precautions and PCP follow-up after this visit. This time, stable for discharge.             Pam Zuniga MD  Attending Emergency Physician         Pam Zuniga MD  01/18/20 9737

## 2020-01-18 NOTE — ED PROVIDER NOTES
[facility] Emergency Department    CHIEF COMPLAINT  Cough (x 2 weeks, productive with clear sputum, denies SOB ) and Hypertension (166/91 at [facility], hx of PE)      HISTORY OF PRESENT ILLNESS  Pankaj Lopez is a 78 y.o. male who presents to the ED complaining of productive cough with clear sputum for the past 2 weeks, hypertension, and generalized fatigue, loss of appetite, not acting his self per patient's daughter. Patient's daughter reports that everyone in the house has had a upper respiratory infection over the past month. Patient also has a history of pulmonary embolism and was taken off of his Eliquis approximately 6 months ago and placed on a 81 mg aspirin. Daughter concerned that patient presents uniquely with his pulmonary embolism so she wants to make sure that he does not have a blood clot or pneumonia. Daughter reports that they were at [facility] this morning and checked his blood pressure and it was 166/91 after he had taken has lisinopril and atenolol for the day. Daughter also reports that he did not want to go for his daily walk and did not eat much for lunch with just not like him. Patient denies measurable fever, chills, body aches, dizziness, syncope, bloody sputum, chest pain, shortness of breath, abdominal pain, nausea, vomiting, diarrhea, constipation, dysuria, hematuria, flank pain, leg swelling, calf pain, palpitations. Patient has been taking Mucinex for cough with no relief. Patient also reports that he has been taking his blood pressure medications as prescribed. No other complaints, modifying factors or associated symptoms. Nursing notes reviewed. Past Medical History:   Diagnosis Date    Arthritis     History of pulmonary embolus (PE)     Hyperlipidemia     Hypertension      History reviewed. No pertinent surgical history. History reviewed. No pertinent family history.   Social History     Socioeconomic History    Marital status: Single     Spouse name: Not on file    Number of children: Not on file    Years of education: Not on file    Highest education level: Not on file   Occupational History    Not on file   Social Needs    Financial resource strain: Not on file    Food insecurity:     Worry: Not on file     Inability: Not on file    Transportation needs:     Medical: Not on file     Non-medical: Not on file   Tobacco Use    Smoking status: Never Smoker    Smokeless tobacco: Never Used   Substance and Sexual Activity    Alcohol use: Not Currently    Drug use: Not Currently    Sexual activity: Not on file   Lifestyle    Physical activity:     Days per week: Not on file     Minutes per session: Not on file    Stress: Not on file   Relationships    Social connections:     Talks on phone: Not on file     Gets together: Not on file     Attends Muslim service: Not on file     Active member of club or organization: Not on file     Attends meetings of clubs or organizations: Not on file     Relationship status: Not on file    Intimate partner violence:     Fear of current or ex partner: Not on file     Emotionally abused: Not on file     Physically abused: Not on file     Forced sexual activity: Not on file   Other Topics Concern    Not on file   Social History Narrative    Not on file     No current facility-administered medications for this encounter.       Current Outpatient Medications   Medication Sig Dispense Refill    atenolol (TENORMIN) 25 MG tablet Take 1 tablet by mouth 2 times daily 30 tablet 3    lisinopril (PRINIVIL;ZESTRIL) 20 MG tablet Take 1 tablet by mouth 2 times daily 30 tablet 3    lovastatin (MEVACOR) 40 MG tablet Take 40 mg by mouth 2 times daily      Multiple Vitamins-Minerals (OCUVITE ADULT 50+ PO) Take by mouth      Omega-3 Fatty Acids (FISH OIL) 1000 MG CAPS Take 1,000 mg by mouth daily       verapamil (VERELAN) 240 MG extended release capsule Take 240 mg by mouth nightly       No Known Allergies    REVIEW OF SYSTEMS  10 systems reviewed, pertinent positives per HPI otherwise noted to be negative    PHYSICAL EXAM  BP (!) 140/67   Pulse 66   Temp 96.8 °F (36 °C) (Oral)   Resp 16   Ht 6' 1\" (1.854 m)   Wt 197 lb 5 oz (89.5 kg)   SpO2 100%   BMI 26.03 kg/m²   GENERAL APPEARANCE: Awake and alert. Cooperative. No acute distress. Vital signs are stable. Well appearing and non toxic. HEAD: Normocephalic. Atraumatic. EYES: PERRL. EOM's grossly intact. ENT: Mucous membranes are moist.  Bilateral tympanic membranes are intact no bulging, effusion, perforation, erythema. No rhinorrhea. Posterior pharynx is clear no edema, erythema, exudate. NECK: Supple. Normal ROM. HEART: RRR. No murmurs. Distal pulses are equal and intact. Cap refill less than 2 seconds. LUNGS: Respirations unlabored. CTAB. Good air exchange. Speaking comfortably in full sentences. No wheezing, rhonchi, rales, stridor. ABDOMEN: Soft. Non-distended. Non-tender. No guarding or rebound. No masses. No organomegaly. No rigidity. Bowel sounds are present in all 4 quadrants. Negative izaguirre's. Negative McBurney's point. Negative CVA tenderness. EXTREMITIES: No peripheral edema. Moves all extremities equally. All extremities neurovascularly intact. SKIN: Warm and dry. No acute rashes. NEUROLOGICAL: Alert and oriented. CN's 2-12 intact. No gross facial drooping. Strength 5/5, sensation intact. No dysarthria. No dysmetria. No ataxia. PSYCHIATRIC: Normal mood and affect. SCREENINGS       RADIOLOGY  Xr Chest Standard (2 Vw)    Result Date: 1/18/2020  EXAMINATION: TWO XRAY VIEWS OF THE CHEST 1/18/2020 1:55 pm COMPARISON: 11/20/2018 HISTORY: ORDERING SYSTEM PROVIDED HISTORY: Shortness of breath TECHNOLOGIST PROVIDED HISTORY: Reason for exam:->Shortness of breath Reason for Exam: Cough (x 2 weeks, productive with clear sputum, denies SOB ) Acuity: Acute Type of Exam: Initial FINDINGS: Heart size and pulmonary vasculature within normal limits. Thoracic aorta tortuous. Lungs clear. Costophrenic angles sharp     No evidence of pneumonia     Ct Chest Pulmonary Embolism W Contrast    Result Date: 1/18/2020  EXAMINATION: CTA OF THE CHEST 1/18/2020 2:52 pm TECHNIQUE: CTA of the chest was performed after the administration of intravenous contrast.  Multiplanar reformatted images are provided for review. MIP images are provided for review. Dose modulation, iterative reconstruction, and/or weight based adjustment of the mA/kV was utilized to reduce the radiation dose to as low as reasonably achievable. COMPARISON: None. HISTORY: ORDERING SYSTEM PROVIDED HISTORY: r/o pe TECHNOLOGIST PROVIDED HISTORY: Reason for exam:->r/o pe Reason for Exam: cough, r/o PE, hx PE no surg Acuity: Acute Type of Exam: Initial FINDINGS: Pulmonary Arteries: Pulmonary arteries are adequately opacified for evaluation. No evidence of intraluminal filling defect to suggest pulmonary embolism. Main pulmonary artery is normal in caliber. Mediastinum: No evidence of mediastinal lymphadenopathy. The heart and pericardium demonstrate no acute abnormality. There is no acute abnormality of the thoracic aorta. Lungs/pleura: The lungs are without acute process. No focal consolidation or pulmonary edema. No evidence of pleural effusion or pneumothorax. Upper Abdomen: Limited images of the upper abdomen are unremarkable. Soft Tissues/Bones: No acute bone or soft tissue abnormality. No evidence of pulmonary embolism or acute pulmonary abnormality. ED COURSE/MDM  Patient seen and evaluated. Old records reviewed. Diagnostic testing reviewed and results discussed. I have seen this patient in collaboration with supervising physician Dr. Saran Leger. We thoroughly discussed the history, physical exam, diagnostic testing and emergency department course. Yanely Nielson presented to the ED today with above noted complaints.   CBC and CMP unremarkable no leukocytosis, bandemia, anemia, acute kidney injury, lactate abnormality. Urinalysis negative for infection or hematuria. CT of the chest shows no evidence of pulmonary embolism or acute pulmonary abnormality. Chest x-ray shows no evidence of pneumonia. Patient and daughter reassured. Supportive therapies discussed such as rest, continue Mucinex as needed, drink plenty of fluids. Patient daughter verbalized understanding. While in ED patient received   Medications   0.9 % sodium chloride bolus (0 mLs Intravenous Stopped 1/18/20 1610)   iopamidol (ISOVUE-370) 76 % injection 75 mL (75 mLs Intravenous Given 1/18/20 1508)             At this point I do not feel the patient requires further work up and it is reasonable to discharge the patient. A discussion was had with the patient and/or their surrogate regarding diagnosis, diagnostic testing results, treatment/ plan of care, and follow up. There was shared decision-making between myself as well as the patient and/or their surrogate and we are all in agreement with discharge home. There was an opportunity for questions and all questions were answered to the best of my ability and to the satisfaction of the patient and/or patient family. Patient will follow up with pcp for further evaluation/treatment. The patient was given strict return precautions as we discussed symptoms that would necessitate return to the ED. Patient will return to ED for new/worsening symptoms. The patient verbalized their understanding and agreement with the above plan. Please refer to AVS for further details regarding discharge instructions.       Results for orders placed or performed during the hospital encounter of 01/18/20   CBC Auto Differential   Result Value Ref Range    WBC 8.5 4.0 - 11.0 K/uL    RBC 5.49 4.20 - 5.90 M/uL    Hemoglobin 16.9 13.5 - 17.5 g/dL    Hematocrit 49.2 40.5 - 52.5 %    MCV 89.7 80.0 - 100.0 fL    MCH 30.9 26.0 - 34.0 pg    MCHC 34.5 31.0 - 36.0 g/dL    RDW 13.9 12.4 - 15.4 % Platelets 911 604 - 130 K/uL    MPV 9.6 5.0 - 10.5 fL    Neutrophils % 68.2 %    Lymphocytes % 20.7 %    Monocytes % 8.6 %    Eosinophils % 2.0 %    Basophils % 0.5 %    Neutrophils Absolute 5.8 1.7 - 7.7 K/uL    Lymphocytes Absolute 1.8 1.0 - 5.1 K/uL    Monocytes Absolute 0.7 0.0 - 1.3 K/uL    Eosinophils Absolute 0.2 0.0 - 0.6 K/uL    Basophils Absolute 0.0 0.0 - 0.2 K/uL   Basic Metabolic Panel w/ Reflex to MG   Result Value Ref Range    Sodium 136 136 - 145 mmol/L    Potassium reflex Magnesium 4.0 3.5 - 5.1 mmol/L    Chloride 98 (L) 99 - 110 mmol/L    CO2 21 21 - 32 mmol/L    Anion Gap 17 (H) 3 - 16    Glucose 118 (H) 70 - 99 mg/dL    BUN 21 (H) 7 - 20 mg/dL    CREATININE 1.0 0.8 - 1.3 mg/dL    GFR Non-African American >60 >60    GFR African American >60 >60    Calcium 9.4 8.3 - 10.6 mg/dL   Urine, reflex to culture   Result Value Ref Range    Color, UA YELLOW Straw/Yellow    Clarity, UA Clear Clear    Glucose, Ur Negative Negative mg/dL    Bilirubin Urine Negative Negative    Ketones, Urine Negative Negative mg/dL    Specific Gravity, UA 1.020 1.005 - 1.030    Blood, Urine Negative Negative    pH, UA 6.5 5.0 - 8.0    Protein, UA Negative Negative mg/dL    Urobilinogen, Urine 1.0 <2.0 E.U./dL    Nitrite, Urine Negative Negative    Leukocyte Esterase, Urine Negative Negative    Microscopic Examination Not Indicated     Urine Type NotGiven     Urine Reflex to Culture Not Indicated        I estimate there is LOW risk for PULMONARY EMBOLISM, ACUTE CORONARY SYNDROME, OR THORACIC AORTIC DISSECTION, thus I consider the discharge disposition reasonable. Andrew Todd and I have discussed the diagnosis and risks, and we agree with discharging home to follow-up with their primary doctor. We also discussed returning to the Emergency Department immediately if new or worsening symptoms occur.  We have discussed the symptoms which are most concerning (e.g., bloody sputum, fever, worsening pain or shortness of breath,

## 2020-01-18 NOTE — ED NOTES
Pt presents to ED via private vehicle for c/o cough x 2 weeks. States it is occasionally productive with white sputum. Denies CP, SOB. Denies fevers, chills, generalized aches, sore throat or headache. He has been taking tylenol and mucinex at home with some relief. Pt also concerned about his BP being high. States he has a hx of PE. Pt is A & O x 4. Respirations non-labored. Able to speak in full sentences. Skin pink, warm and dry. VSS.       Jose Walsh RN  01/18/20 3618 Good

## 2020-01-18 NOTE — ED NOTES
Pt returned from bathroom without incident. Urine specimen sent to lab. Pt remains A & O x 4. Respirations non-labored. VSS. Family at bedside. Call light in reach.       Myrtle Rosales RN  01/18/20 [id number]